# Patient Record
Sex: FEMALE | Race: BLACK OR AFRICAN AMERICAN | Employment: FULL TIME | URBAN - METROPOLITAN AREA
[De-identification: names, ages, dates, MRNs, and addresses within clinical notes are randomized per-mention and may not be internally consistent; named-entity substitution may affect disease eponyms.]

---

## 2019-03-07 ENCOUNTER — HOSPITAL ENCOUNTER (EMERGENCY)
Facility: CLINIC | Age: 24
Discharge: HOME | End: 2019-03-07
Attending: EMERGENCY MEDICINE | Admitting: EMERGENCY MEDICINE

## 2019-03-07 VITALS
OXYGEN SATURATION: 100 % | BODY MASS INDEX: 20.28 KG/M2 | SYSTOLIC BLOOD PRESSURE: 105 MMHG | HEART RATE: 97 BPM | TEMPERATURE: 99 F | DIASTOLIC BLOOD PRESSURE: 74 MMHG | RESPIRATION RATE: 12 BRPM | WEIGHT: 120 LBS

## 2019-03-07 VITALS
WEIGHT: 123.46 LBS | HEART RATE: 113 BPM | BODY MASS INDEX: 20.86 KG/M2 | RESPIRATION RATE: 18 BRPM | DIASTOLIC BLOOD PRESSURE: 64 MMHG | OXYGEN SATURATION: 94 % | TEMPERATURE: 99.6 F | SYSTOLIC BLOOD PRESSURE: 104 MMHG

## 2019-03-07 DIAGNOSIS — J02.9 VIRAL PHARYNGITIS: ICD-10-CM

## 2019-03-07 DIAGNOSIS — J02.9 ACUTE PHARYNGITIS, UNSPECIFIED ETIOLOGY: ICD-10-CM

## 2019-03-07 LAB
DEPRECATED S PYO AG THROAT QL EIA: NORMAL
SPECIMEN SOURCE: NORMAL

## 2019-03-07 PROCEDURE — 25000132 ZZH RX MED GY IP 250 OP 250 PS 637: Performed by: EMERGENCY MEDICINE

## 2019-03-07 PROCEDURE — 99283 EMERGENCY DEPT VISIT LOW MDM: CPT

## 2019-03-07 PROCEDURE — 87880 STREP A ASSAY W/OPTIC: CPT | Performed by: EMERGENCY MEDICINE

## 2019-03-07 PROCEDURE — 87081 CULTURE SCREEN ONLY: CPT | Performed by: EMERGENCY MEDICINE

## 2019-03-07 PROCEDURE — 25000125 ZZHC RX 250: Performed by: EMERGENCY MEDICINE

## 2019-03-07 PROCEDURE — 87077 CULTURE AEROBIC IDENTIFY: CPT | Performed by: EMERGENCY MEDICINE

## 2019-03-07 RX ORDER — IBUPROFEN 800 MG/1
800 TABLET, FILM COATED ORAL EVERY 8 HOURS PRN
Qty: 30 TABLET | Refills: 0 | Status: SHIPPED | OUTPATIENT
Start: 2019-03-07 | End: 2019-03-15

## 2019-03-07 RX ORDER — IBUPROFEN 200 MG
400 TABLET ORAL ONCE
Status: COMPLETED | OUTPATIENT
Start: 2019-03-07 | End: 2019-03-07

## 2019-03-07 RX ADMIN — LIDOCAINE HYDROCHLORIDE 30 ML: 20 SOLUTION ORAL; TOPICAL at 19:42

## 2019-03-07 RX ADMIN — IBUPROFEN 400 MG: 200 TABLET, FILM COATED ORAL at 03:47

## 2019-03-07 RX ADMIN — Medication 10 MG: at 19:42

## 2019-03-07 ASSESSMENT — ENCOUNTER SYMPTOMS
SORE THROAT: 1
SORE THROAT: 1
FEVER: 0
ABDOMINAL PAIN: 0
COUGH: 0
VOMITING: 0
DIARRHEA: 0
HEADACHES: 1
CHILLS: 1
COUGH: 0
NAUSEA: 1
TROUBLE SWALLOWING: 1
VOMITING: 0
DIARRHEA: 0

## 2019-03-07 NOTE — ED PROVIDER NOTES
History     Chief Complaint:  Pharyngitis    HPI:  The history is provided by the patient.      Malou Polo is a generally healthy 23 year old female who presents with sore throat. The patient reports an onset of sore throat yesterday evening with accompanying nausea, no emesis. She denies recent known contact with other sick persons. She denies cough or ear pain. She notes that she did not receive her influenza vaccine this year.     Allergies:  No known drug allergies      Medications:    The patient is not currently taking any prescribed medications.     Past Medical History:    The patient does not have any past pertinent medical history.     Past Surgical History:    History reviewed. No pertinent surgical history.     Family History:    Diabetes     Social History:  Smoking status: never  Alcohol use: no   PCP: [facility]    Review of Systems   Constitutional: Positive for chills.   HENT: Positive for sore throat. Negative for ear pain.    Respiratory: Negative for cough.    Gastrointestinal: Positive for nausea. Negative for abdominal pain, diarrhea and vomiting.   All other systems reviewed and are negative.      Physical Exam     Patient Vitals for the past 24 hrs:   BP Temp Temp src Pulse Resp SpO2 Weight   03/07/19 0323 104/64 99.6  F (37.6  C) Oral 113 18 94 % 56 kg (123 lb 7.3 oz)        Physical Exam  General:              Well-nourished              Speaking in full sentences  Eyes:              Conjunctiva without injection or scleral icterus              PERRL              EOM full w/out entrapment or proptosis  ENT:              Moist mucous membranes              Posterior oropharynx with bilateral tonsillar erythema, no asymmetry, no uvular deviation              No tonsillar hypertrophy, exudate, asymmetry, nor uvular deviation              Palatal petechiae noted              Bilateral TM translucent and gray without air/fluid level or overlying erythema, bony landmarks  visualized.              Nares patent              Pinnae normal              No midface swelling, erythema, or asymmetry  Neck:              Full ROM              No stiffness appreciated              Bilateral submandibular lymphadenopathy  Resp:              Lungs CTAB              No crackles, wheezing or audible rubs              Good air movement  CV:                    Tachycardic rate, regular rhythm              S1 and S2 present              No murmur, gallop or rub  GI:              BS present              Abdomen soft without distention              Non-tender to light and deep palpation              No guarding or rebound tenderness  Skin:              Warm, dry, well perfused              No rashes or open wounds on exposed skin  MSK:              Moves all extremities              No focal deformities or swelling  Neuro:              Alert              Answers questions appropriately              Moves all extremities equally              Gait stable  Psych:              Normal affect, normal mood    Emergency Department Course     Laboratory:  Rapid strep screen: Negative    Interventions:  0347: ibuprofen 400 mg, PO     Emergency Department Course:  Past medical records, nursing notes, and vitals reviewed.  0340: I performed an exam of the patient and obtained history, as documented above.      I rechecked the patient. Findings and plan explained to the Patient. Patient discharged home with instructions regarding supportive care, medications, and reasons to return. The importance of close follow-up was reviewed.      Impression & Plan      Medical Decision Making:  This patient presented with sore throat and clinical evidence of pharyngitis.  The rapid strep test is negative, and formal culture has been set up in the lab. There is no clinical evidence of peritonsillar abscess, retropharyngeal abscess, Lemierre's Syndrome, epiglottis, or Mj's angina. The etiology is most likely viral.   I have  recommended treatment with analgesics, and we will await formal culture results.  If the culture is positive, she will be contacted to initiate anti-microbial therapy. Return if increasing pain, change in voice, neck pain, vomiting, fever, or shortness of breath. Follow-up with primary physician if not improving in 3-5 days. Given her well appearance, I would not test further for other etiologies of serious bacterial infections.       Diagnosis:    ICD-10-CM    1. Acute pharyngitis, unspecified etiology J02.9        Disposition:  discharged to home    I, Megan Beh, am serving as a scribe at 3:40 AM on 3/7/2019 to document services personally performed by Ryan Sidhu MD based on my observations and the provider's statements to me.      Megan Beh  3/7/2019   [facility] EMERGENCY DEPARTMENT       Ryan Sidhu MD  03/07/19 0414

## 2019-03-07 NOTE — ED AVS SNAPSHOT
[facility] Emergency Department  [address]  [facility] [address]  Phone:  [phone]  Fax:  [phone]                                    Malou Polo   MRN: [id number]    Department:  [facility] Emergency Department   Date of Visit:  3/7/2019           After Visit Summary Signature Page    I have received my discharge instructions, and my questions have been answered. I have discussed any challenges I see with this plan with the nurse or doctor.    ..........................................................................................................................................  Patient/Patient Representative Signature      ..........................................................................................................................................  Patient Representative Print Name and Relationship to Patient    ..................................................               ................................................  Date                                   Time    ..........................................................................................................................................  Reviewed by Signature/Title    ...................................................              ..............................................  Date                                               Time          22EPIC Rev 08/18

## 2019-03-07 NOTE — ED AVS SNAPSHOT
[facility] Emergency Department  [address]  [facility] [address]  Phone:  [phone]  Fax:  [phone]                                    Malou Polo   MRN: [id number]    Department:  [facility] Emergency Department   Date of Visit:  3/7/2019           After Visit Summary Signature Page    I have received my discharge instructions, and my questions have been answered. I have discussed any challenges I see with this plan with the nurse or doctor.    ..........................................................................................................................................  Patient/Patient Representative Signature      ..........................................................................................................................................  Patient Representative Print Name and Relationship to Patient    ..................................................               ................................................  Date                                   Time    ..........................................................................................................................................  Reviewed by Signature/Title    ...................................................              ..............................................  Date                                               Time          22EPIC Rev 08/18

## 2019-03-07 NOTE — ED AVS SNAPSHOT
[facility] EMERGENCY DEPARTMENT: [phone]                                              INTERAGENCY TRANSFER FORM - LAB / IMAGING / EKG / EMG RESULTS   3/7/2019                   Hospital Admission Date: 3/7/2019  YESI WORLEY   : 1995  Sex: Female         Attending Provider:  Ryan Sidhu MD    Allergies:  No Known Allergies    Infection:  None   Service:  EMERGENCY ME    Ht:  --   Wt:  56 kg (123 lb 7.3 oz)   Admission Wt:  56 kg (123 lb 7.3 oz)    BMI:  --   BSA:  --            Patient PCP Information     Provider PCP Type    [facility]         Lab Results - 3 Days      Beta strep group A culture [[id number]]  Resulted: 19 0346, Result status: In process   Ordering provider:  Ryan Sidhu MD  19 Resulting lab:  [facility]    Specimen Information    Type Source Collected On       19 032            Rapid strep screen [[id number]]  Resulted: 19 0345, Result status: Final result   Ordering provider:  Ryan Sidhu MD  19 0323 Resulting lab:  [facility]    Specimen Information    Type Source Collected On   Throat   19 0327          Components    Component Value Reference Range Flag Lab   Specimen Description Throat         Rapid Strep A Screen NEGATIVE: No Group A streptococcal antigen detected by immunoassay, await culture report.     [facility]            Testing Performed By     Lab - Abbreviation Name Director Address Valid Date Range    12 - [facility] Unknown [address] [facility] [address] 05/08/15 1057 - Present            Unresulted Labs (24h ago, onward)    None      Encounter-Level Documents:    There are no encounter-level documents.     Order-Level Documents:    There are no order-level documents.

## 2019-03-08 NOTE — ED TRIAGE NOTES
Pt here with c/o sore throat. Pt seen earlier today for this with negative strep. Pt states it hurts to swallow. No drooling or hot-potato voice. No respitory issues. ABC intact.

## 2019-03-08 NOTE — ED PROVIDER NOTES
History     Chief Complaint:  Sore Throat    HPI   Malou Polo is a 23 year old, otherwise healthy female who presents for evaluation of a sore throat. She reports the pain began two nights ago, late, and has progressively worsened since then. She presented to the ED early this morning for evaluation of the pain; the rapid strep was negative and she was given Tylenol. Throughout the day, she reports her throat has become tighter and she has been unable to swallow anything since the medication this morning, including additional analgesia. This prompted her to re-present. Here, she states the pain is high in her throat, but does not radiate to her ear or jaw. She does report a headache, but is unsure if it is related. She denies any fevers, coughs, vomiting, diarrhea, or recent sick contacts. Of note, she reports she was fully immunized as a child.     Allergies:  NKDA    Medications:    The patient is currently on no regular medications.    Past Medical History:    The patient denies any significant past medical history.    Past Surgical History:    The patient does not have any pertinent past surgical history.     Family History:    No past pertinent family history.     Social History:  Marital Status:  Single [1]  Negative for tobacco use.  Negative for alcohol use.     Review of Systems   Constitutional: Negative for fever.   HENT: Positive for sore throat and trouble swallowing.    Respiratory: Negative for cough.    Gastrointestinal: Negative for diarrhea and vomiting.   Neurological: Positive for headaches.   All other systems reviewed and are negative.      Physical Exam     Patient Vitals for the past 24 hrs:   BP Temp Temp src Pulse Heart Rate Resp SpO2 Weight   03/07/19 2000 105/74 -- -- 97 -- -- 100 % --   03/07/19 1857 103/69 99  F (37.2  C) Temporal -- 94 12 95 % 54.4 kg (120 lb)      Physical Exam    GEN:    Pleasant, age appropriate.     Resting comfortably in the bed.  HEENT:    Tympanic membranes  are clear bilateral.      Oropharynx is moist.       Bilateral tonsillar erythema without exudate or asymmetric edema.     No deviation of the uvula.     No pooling of secretions, trismus or sublingual edema.  Eyes:    Conjunctiva normal, PERRL  Neck:    Supple, no meningismus.  No pain with manipulation of the hyoid.   CV:     Regular rate and rhythm, no murmurs, rubs or gallops.    PULM:    Clear to auscultation bilateral.       No respiratory distress.       No stridor.  ABD:    Soft, non-tender, non-distended.      No rebound or guarding.     No splenomegaly.  MSK:     No gross deformity to all four extremities.   LYMPH:   No cervical lymphadenopathy.  NEURO:   Alert.  Normal muscular tone, no atrophy.  Skin:    Warm, dry and intact.    PSYCH:    Mood is good and affect is appropriate.      Emergency Department Course   Interventions:  1942 Decadron, 10 mg, PO   GI Cocktail (Maalox/Mylanta and viscous Lidocaine), 30 mL suspension, PO     Emergency Department Course:  Nursing notes and vitals reviewed.   (1929) I performed an exam of the patient as documented above.      Medicine administered as documented above.      (2005) I rechecked the patient and discussed the results of her workup thus far.      Findings and plan explained to the Patient. Patient discharged home with instructions regarding supportive care, medications, and reasons to return. The importance of close follow-up was reviewed. The patient was prescribed ibuprofen and magic mouthwash.      I personally answered all related questions prior to discharge.     Impression & Plan      Medical Decision Making:  Malou Polo is a 23 year old female was seen in the ED today for sore throat.  On examination there are no signs suggestive of periapical abscess, peritonsillar abscess, retropharyngeal abscess,  Mj's angina or Lemierre's syndrome.  Rapid strep test in the ED at prior visit is Negative signaling viral pharyngitis. Thus antibiotics were not   initiated.  I believe patient return to the ED for pain relief as it was not adequate at home.  The patient was given decadron for symptomatic control of the odynophagia and will also be provided with [employer] Magic mouthwash as she had significant improvement with GI cocktail in the ED.. Patient is encouraged to use acetaminophen and ibuprofen for analgesia and instructed to return for fever, worsening pain, inability to swallow, neck pain or any other concerns.    Diagnosis:    ICD-10-CM    1. Viral pharyngitis J02.9        Disposition:  discharged to home    Discharge Medications:     Medication List      Started    ibuprofen 800 MG tablet  Commonly known as:  ADVIL/MOTRIN  800 mg, Oral, EVERY 8 HOURS PRN     lidocaine visc 2% 2.5mL/5mL & maalox/mylanta w/ simeth 2.5mL/5mL & diphenhydrAMINE 5mg/5mL Susp suspension  Commonly known as:  MAGIC Mouthwash [facility]OS[facility]  10 mLs, Swish & Spit, EVERY 6 HOURS PRN          Scribe Disclosure:  I, Mary Duncan, am serving as a scribe on 3/7/2019 at 7:29 PM to personally document services performed by Kedar Lozano MD based on my observations and the provider's statements to me.       Mary Duncan  3/7/2019   [facility] EMERGENCY DEPARTMENT       Kedar Lozano MD  03/08/19 0041

## 2019-03-09 LAB
BACTERIA SPEC CULT: ABNORMAL
Lab: ABNORMAL
SPECIMEN SOURCE: ABNORMAL